# Patient Record
Sex: FEMALE | Race: WHITE | NOT HISPANIC OR LATINO | Employment: OTHER | URBAN - NONMETROPOLITAN AREA
[De-identification: names, ages, dates, MRNs, and addresses within clinical notes are randomized per-mention and may not be internally consistent; named-entity substitution may affect disease eponyms.]

---

## 2020-10-06 ENCOUNTER — HISTORICAL (OUTPATIENT)
Dept: ADMINISTRATIVE | Facility: HOSPITAL | Age: 65
End: 2020-10-06

## 2020-10-13 ENCOUNTER — HISTORICAL (OUTPATIENT)
Dept: ADMINISTRATIVE | Facility: HOSPITAL | Age: 65
End: 2020-10-13

## 2023-04-23 ENCOUNTER — HOSPITAL ENCOUNTER (EMERGENCY)
Facility: HOSPITAL | Age: 68
Discharge: HOME | End: 2023-04-23
Attending: FAMILY MEDICINE
Payer: MEDICARE

## 2023-04-23 VITALS
RESPIRATION RATE: 20 BRPM | BODY MASS INDEX: 25.27 KG/M2 | HEART RATE: 98 BPM | HEIGHT: 67 IN | SYSTOLIC BLOOD PRESSURE: 119 MMHG | WEIGHT: 161 LBS | OXYGEN SATURATION: 96 % | DIASTOLIC BLOOD PRESSURE: 67 MMHG | TEMPERATURE: 99 F

## 2023-04-23 DIAGNOSIS — R07.9 CHEST PAIN: ICD-10-CM

## 2023-04-23 DIAGNOSIS — R10.13 EPIGASTRIC PAIN: ICD-10-CM

## 2023-04-23 DIAGNOSIS — N30.00 ACUTE CYSTITIS WITHOUT HEMATURIA: ICD-10-CM

## 2023-04-23 DIAGNOSIS — R11.2 NAUSEA AND VOMITING, UNSPECIFIED VOMITING TYPE: Primary | ICD-10-CM

## 2023-04-23 LAB
ABS NEUT CALC (OHS): 9.09 X10(3)/MCL (ref 2.1–9.2)
ALBUMIN SERPL-MCNC: 4.9 G/DL (ref 3.4–5)
ALBUMIN/GLOB SERPL: 1.5 RATIO
ALP SERPL-CCNC: 81 UNIT/L (ref 50–144)
ALT SERPL-CCNC: 42 UNIT/L (ref 1–45)
ANION GAP SERPL CALC-SCNC: 9 MEQ/L (ref 2–13)
APPEARANCE UR: CLEAR
AST SERPL-CCNC: 39 UNIT/L (ref 14–36)
BACTERIA #/AREA URNS AUTO: ABNORMAL /HPF
BILIRUB UR QL STRIP.AUTO: NEGATIVE MG/DL
BILIRUBIN DIRECT+TOT PNL SERPL-MCNC: 0.6 MG/DL (ref 0–1)
BUN SERPL-MCNC: 25 MG/DL (ref 7–20)
CALCIUM SERPL-MCNC: 9.7 MG/DL (ref 8.4–10.2)
CHLORIDE SERPL-SCNC: 102 MMOL/L (ref 98–110)
CK SERPL-CCNC: 26 U/L (ref 30–135)
CO2 SERPL-SCNC: 27 MMOL/L (ref 21–32)
COLOR UR AUTO: YELLOW
CREAT SERPL-MCNC: 0.79 MG/DL (ref 0.66–1.25)
CREAT/UREA NIT SERPL: 32 (ref 12–20)
EOSINOPHIL NFR BLD MANUAL: 0.2 X10(3)/MCL (ref 0–0.9)
EOSINOPHIL NFR BLD MANUAL: 2 % (ref 0–8)
ERYTHROCYTE [DISTWIDTH] IN BLOOD BY AUTOMATED COUNT: 12.6 % (ref 11–14.5)
GFR SERPLBLD CREATININE-BSD FMLA CKD-EPI: 81 MLS/MIN/1.73/M2
GLOBULIN SER-MCNC: 3.2 GM/DL (ref 2–3.9)
GLUCOSE SERPL-MCNC: 119 MG/DL (ref 70–115)
GLUCOSE UR QL STRIP.AUTO: NEGATIVE MG/DL
HCT VFR BLD AUTO: 44.3 % (ref 36–48)
HGB BLD-MCNC: 14.7 G/DL (ref 11.8–16)
IMM GRANULOCYTES # BLD AUTO: 0.05 X10(3)/MCL (ref 0–0.03)
IMM GRANULOCYTES NFR BLD AUTO: 0.5 % (ref 0–0.5)
KETONES UR QL STRIP.AUTO: NEGATIVE MG/DL
LEUKOCYTE ESTERASE UR QL STRIP.AUTO: ABNORMAL UNIT/L
LIPASE SERPL-CCNC: 25 U/L (ref 23–300)
LYMPHOCYTES NFR BLD MANUAL: 0.71 X10(3)/MCL
LYMPHOCYTES NFR BLD MANUAL: 7 % (ref 13–40)
MCH RBC QN AUTO: 29.5 PG (ref 27–34)
MCV RBC AUTO: 88.8 FL (ref 79–99)
MEAN CELL HEMOGLOBIN CONCENTRATION (OHS) G/DL: 33.2 G/DL (ref 31–37)
MONOCYTES NFR BLD MANUAL: 0.1 X10(3)/MCL (ref 0.1–1.3)
MONOCYTES NFR BLD MANUAL: 1 % (ref 2–11)
NEUTROPHILS NFR BLD MANUAL: 90 % (ref 47–80)
NITRITE UR QL STRIP.AUTO: NEGATIVE
NRBC BLD AUTO-RTO: 0 % (ref 0–1)
PH UR STRIP.AUTO: 6 [PH]
PLATELET # BLD AUTO: 190 X10(3)/MCL (ref 140–371)
PMV BLD AUTO: 10.5 FL (ref 9.4–12.4)
POTASSIUM SERPL-SCNC: 3.7 MMOL/L (ref 3.5–5.1)
PROT SERPL-MCNC: 8.1 GM/DL (ref 6.3–8.2)
PROT UR QL STRIP.AUTO: ABNORMAL MG/DL
RBC # BLD AUTO: 4.99 X10(6)/MCL (ref 4–5.1)
RBC #/AREA URNS AUTO: ABNORMAL /HPF
RBC UR QL AUTO: ABNORMAL UNIT/L
SODIUM SERPL-SCNC: 138 MMOL/L (ref 135–145)
SP GR UR STRIP.AUTO: 1.01
SQUAMOUS #/AREA URNS AUTO: ABNORMAL /HPF
TROPONIN I SERPL-MCNC: <0.012 NG/ML (ref 0–0.03)
UROBILINOGEN UR STRIP-ACNC: 0.2 MG/DL
WBC # SPEC AUTO: 10.1 X10(3)/MCL (ref 4–11.5)
WBC #/AREA URNS AUTO: >100 /HPF

## 2023-04-23 PROCEDURE — 87077 CULTURE AEROBIC IDENTIFY: CPT | Performed by: FAMILY MEDICINE

## 2023-04-23 PROCEDURE — 93010 ELECTROCARDIOGRAM REPORT: CPT | Mod: ,,, | Performed by: INTERNAL MEDICINE

## 2023-04-23 PROCEDURE — 85027 COMPLETE CBC AUTOMATED: CPT | Performed by: FAMILY MEDICINE

## 2023-04-23 PROCEDURE — 96361 HYDRATE IV INFUSION ADD-ON: CPT

## 2023-04-23 PROCEDURE — 84484 ASSAY OF TROPONIN QUANT: CPT | Performed by: FAMILY MEDICINE

## 2023-04-23 PROCEDURE — 96374 THER/PROPH/DIAG INJ IV PUSH: CPT

## 2023-04-23 PROCEDURE — 83690 ASSAY OF LIPASE: CPT | Performed by: FAMILY MEDICINE

## 2023-04-23 PROCEDURE — 81001 URINALYSIS AUTO W/SCOPE: CPT | Performed by: FAMILY MEDICINE

## 2023-04-23 PROCEDURE — 63600175 PHARM REV CODE 636 W HCPCS: Performed by: FAMILY MEDICINE

## 2023-04-23 PROCEDURE — 99285 EMERGENCY DEPT VISIT HI MDM: CPT | Mod: 25

## 2023-04-23 PROCEDURE — 82550 ASSAY OF CK (CPK): CPT | Performed by: FAMILY MEDICINE

## 2023-04-23 PROCEDURE — 87088 URINE BACTERIA CULTURE: CPT | Performed by: FAMILY MEDICINE

## 2023-04-23 PROCEDURE — 80053 COMPREHEN METABOLIC PANEL: CPT | Performed by: FAMILY MEDICINE

## 2023-04-23 PROCEDURE — 36415 COLL VENOUS BLD VENIPUNCTURE: CPT | Performed by: FAMILY MEDICINE

## 2023-04-23 PROCEDURE — 25000003 PHARM REV CODE 250: Performed by: FAMILY MEDICINE

## 2023-04-23 PROCEDURE — 93010 EKG 12-LEAD: ICD-10-PCS | Mod: ,,, | Performed by: INTERNAL MEDICINE

## 2023-04-23 PROCEDURE — 93005 ELECTROCARDIOGRAM TRACING: CPT

## 2023-04-23 PROCEDURE — 85025 COMPLETE CBC W/AUTO DIFF WBC: CPT | Performed by: FAMILY MEDICINE

## 2023-04-23 RX ORDER — CIPROFLOXACIN 500 MG/1
500 TABLET ORAL EVERY 12 HOURS
Qty: 14 TABLET | Refills: 0 | Status: SHIPPED | OUTPATIENT
Start: 2023-04-23

## 2023-04-23 RX ORDER — CIPROFLOXACIN 500 MG/1
500 TABLET ORAL
Status: COMPLETED | OUTPATIENT
Start: 2023-04-23 | End: 2023-04-23

## 2023-04-23 RX ORDER — PROCHLORPERAZINE EDISYLATE 5 MG/ML
10 INJECTION INTRAMUSCULAR; INTRAVENOUS
Status: COMPLETED | OUTPATIENT
Start: 2023-04-23 | End: 2023-04-23

## 2023-04-23 RX ORDER — PROMETHAZINE HYDROCHLORIDE 25 MG/1
25 TABLET ORAL EVERY 6 HOURS PRN
Qty: 20 TABLET | Refills: 0 | Status: SHIPPED | OUTPATIENT
Start: 2023-04-23

## 2023-04-23 RX ORDER — ALUMINUM HYDROXIDE, MAGNESIUM HYDROXIDE, AND SIMETHICONE 2400; 240; 2400 MG/30ML; MG/30ML; MG/30ML
15 SUSPENSION ORAL EVERY 6 HOURS PRN
Qty: 240 ML | Refills: 0 | Status: SHIPPED | OUTPATIENT
Start: 2023-04-23 | End: 2024-04-22

## 2023-04-23 RX ADMIN — PROCHLORPERAZINE EDISYLATE 10 MG: 5 INJECTION INTRAMUSCULAR; INTRAVENOUS at 02:04

## 2023-04-23 RX ADMIN — SODIUM CHLORIDE 1000 ML: 9 INJECTION, SOLUTION INTRAVENOUS at 02:04

## 2023-04-23 RX ADMIN — CIPROFLOXACIN 500 MG: 500 TABLET, FILM COATED ORAL at 05:04

## 2023-04-23 NOTE — ED PROVIDER NOTES
Encounter Date: 4/23/2023       History     Chief Complaint   Patient presents with    Vomiting     Pt c/o vomiting and epigastric pain since last night w/headache this morning.  Also c/o burning w/urination x2 days and things she has a yeast infection     Patient is a 68-year-old lady who presents with a chief complaint of nausea and vomiting onset last night, epigastric discomfort.  And shooting type headache.  She denies fevers she denies diarrhea she surgical history significant for a cholecystectomy, hysterectomy, abdominoplasty.  She is history of GERD and takes Protonix.      Review of patient's allergies indicates:   Allergen Reactions    Boniva [ibandronate]     Demerol [meperidine]     Ketamine     Limbitrol [amitriptyline-chlordiazepoxide]     Macrobid [nitrofurantoin monohyd/m-cryst]     Nitrofurantoin     Norco [hydrocodone-acetaminophen]     Sulfa (sulfonamide antibiotics)     Tessalon perle [benzonatate]     Trazodone      Past Medical History:   Diagnosis Date    Anxiety disorder, unspecified     Diverticulitis     GERD (gastroesophageal reflux disease)     High cholesterol     Hypothyroidism, unspecified     Interstitial cystitis     Osteoporosis      Past Surgical History:   Procedure Laterality Date    ABDOMINOPLASTY      CHOLECYSTECTOMY      FOOT FRACTURE SURGERY Left     HEEL SPUR SURGERY Right     HYSTERECTOMY       No family history on file.     Review of Systems   Constitutional: Negative.    Respiratory: Negative.     Cardiovascular: Negative.    Gastrointestinal:  Positive for abdominal pain, nausea and vomiting.   Skin: Negative.    Neurological:  Positive for headaches.     Physical Exam     Initial Vitals [04/23/23 1420]   BP Pulse Resp Temp SpO2   124/82 (!) 114 (!) 24 99 °F (37.2 °C) 100 %      MAP       --         Physical Exam    Constitutional: She appears well-developed and well-nourished.   HENT:   Head: Normocephalic and atraumatic.   Eyes: EOM are normal. Pupils are equal,  round, and reactive to light.   Neck: Neck supple.   Normal range of motion.  Cardiovascular:            Tachycardic, no m/g/r   Pulmonary/Chest: Breath sounds normal.   Abdominal: Abdomen is soft.   Epigastric tendernes to palpation   Musculoskeletal:         General: Normal range of motion.      Cervical back: Normal range of motion and neck supple.     Neurological: She is alert and oriented to person, place, and time. She has normal strength. No cranial nerve deficit.       ED Course   Procedures  Labs Reviewed   COMPREHENSIVE METABOLIC PANEL - Abnormal; Notable for the following components:       Result Value    Glucose Level 119 (*)     Blood Urea Nitrogen 25.0 (*)     Aspartate Aminotransferase 39 (*)     BUN/Creatinine Ratio 32 (*)     All other components within normal limits   CK - Abnormal; Notable for the following components:    Creatine Kinase 26 (*)     All other components within normal limits   CBC WITH DIFFERENTIAL - Abnormal; Notable for the following components:    IG# 0.05 (*)     All other components within normal limits   MANUAL DIFFERENTIAL - Abnormal; Notable for the following components:    Neut Man 90 (*)     Lymph Man 7 (*)     Monocyte Man 1 (*)     All other components within normal limits   URINALYSIS - Abnormal; Notable for the following components:    Protein, UA Trace (*)     Blood, UA Small (*)     Leukocyte Esterase, UA Large (*)     All other components within normal limits    Narrative:      URINE STABILITY IS 2 HOURS AT ROOM TEMP OR    SIX HOURS REFRIGERATED. PERFORMING TESTING ON    SPECIMENS GREATER THAN THIS AGE MAY AFFECT THE    FOLLOWING TESTS:    PH          SPECIFIC GRAVITY           BLOOD    CLARITY     BILIRUBIN               UROBILINOGEN   URINALYSIS, MICROSCOPIC - Abnormal; Notable for the following components:    Bacteria, UA 3+ (*)     WBC, UA >100 (*)     Squamous Epithelial Cells, UA Many (*)     All other components within normal limits   LIPASE - Normal    TROPONIN I - Normal   CULTURE, URINE   CBC W/ AUTO DIFFERENTIAL    Narrative:     The following orders were created for panel order CBC auto differential.  Procedure                               Abnormality         Status                     ---------                               -----------         ------                     CBC with Differential[[id number]]        Abnormal            Final result               Manual Differential[[id number]]          Abnormal            Final result                 Please view results for these tests on the individual orders.        ECG Results              EKG 12-lead (Preliminary result)  Result time 04/23/23 15:40:20      Wet Read by Tyshawn Muniz MD (04/23/23 15:40:20, [facility], Emergency Medicine)    Sinus tach, rate 106, nl qrs, nl st, nl intervals                                  Imaging Results              CT Abdomen Pelvis  Without Contrast (Final result)  Result time 04/23/23 16:48:15      Final result by Shiv Kendrick MD (04/23/23 16:48:15)                   Impression:        1. There is a large amount over a radiopaque fecal content throughout the colon which can be seen with constipation.    n/a    CATEGORY:n/a    The following dose reduction techniques are used for all CT at [facility]:    1.   Automated exposure control.    2.   Adjustment of the mA and/or kV according to patient size.    3.   Use of iterative reconstruction technique.      Electronically signed by: Shiv Kendrick  Date:    04/23/2023  Time:    16:48               Narrative:      STUDY: CT SCAN OF THE ABDOMEN AND PELVIS WITHOUT INTRAVENOUS CONTRAST    CLINICAL HISTORY & TECHNIQUE:    Lynne Herrera, RT on 4/23/2023  3:38 PM    PT STATUS: ER PT    PROCEDURE: CT ABD/PELVIS W/O CONT    CLINICAL HX:    X 1 DAY    C/O VOMITTING AND EPIGASTRIC PAIN SINCE LAST NIGHT WITH HA THIS AM    ALSO C/O BURNING WITH URINATION    PMH:  N/A    TECHNIQUE:    IV CONTRAST:  NONE    ORAL CONTRAST: NONE    RECTAL CONTRAST: NONE    AXIAL IMAGES @ 5MM INTERVALS WITH MULTI PLANAR RECONSTRUCTION OF IMAGES    TOTAL # OF CT SCANS IN PAST 12 MONTHS: 2    TOTAL IMAGE NUMBER: 150    CTDIvol(mGy): HEAD:  BODY: 6.20    DLP(mGycm): HEAD:  BODY: 353.40    TECH: NN    PT TRANSPORTED W\O INCIDENT    COMPARISON:   none    FINDINGS:    Liver:  No clinically significant abnormalities noted.    Gallbladder/Biliary System:  Compatible with a previous cholecystectomy.    Spleen:  No clinically significant abnormalities noted.    Adrenal glands:  No clinically significant abnormalities noted.    Pancreas:  No clinically significant abnormalities noted.    Kidneys/Urinary Tract:  No clinically significant abnormalities noted.    Urinary bladder:  No clinically significant abnormalities noted.    Prostate gland/uterus and ovaries:  Compatible with a previous hysterectomy    GI tract:  Fluid is present within multiple loops of small bowel.  A large amount of fecal content is present in the colon which can be seen with constipation.    Vascular structures:  Mild atherosclerotic calcification is present in the aorta.    Musculoskeletal structures:  No clinically significant abnormalities noted.    Miscellaneous:  No clinically significant abnormalities noted.                                       CT Head Without Contrast (Preliminary result)  Result time 04/23/23 15:39:12      Wet Read by Tyshawn Muniz MD (04/23/23 15:39:12, [facility] Emergency Medicine)    Per rad: minimal atrophy, no acute findings                                     Medications   ciprofloxacin HCl tablet 500 mg (has no administration in time range)   prochlorperazine injection Soln 10 mg (10 mg Intravenous Given 4/23/23 1435)   sodium chloride 0.9% bolus 1,000 mL 1,000 mL (0 mLs Intravenous Stopped 4/23/23 1626)     Medical Decision Making:   Differential Diagnosis:    Non-stemi, perforated ulcer, intestinal obstruction, gastritis,   Clinical Tests:   Lab Tests: Ordered and Reviewed  The following lab test(s) were unremarkable: CBC, BMP and Urinalysis  Radiological Study: Ordered and Reviewed  Medical Tests: Ordered and Reviewed                        Clinical Impression:   Final diagnoses:  [R07.9] Chest pain  [R11.2] Nausea and vomiting, unspecified vomiting type (Primary)  [R10.13] Epigastric pain  [N30.00] Acute cystitis without hematuria        ED Disposition Condition    Discharge Stable          ED Prescriptions       Medication Sig Dispense Start Date End Date Auth. Provider    promethazine (PHENERGAN) 25 MG tablet Take 1 tablet (25 mg total) by mouth every 6 (six) hours as needed for Nausea. 20 tablet 4/23/2023 -- Tyshawn Muniz MD    aluminum & magnesium hydroxide-simethicone (MAALOX MAXIMUM STRENGTH) 400-400-40 mg/5 mL suspension Take 15 mLs by mouth every 6 (six) hours as needed for Indigestion. 240 mL 4/23/2023 4/22/2024 Tyshawn Muniz MD    ciprofloxacin HCl (CIPRO) 500 MG tablet Take 1 tablet (500 mg total) by mouth every 12 (twelve) hours. 14 tablet 4/23/2023 -- Tyshawn Muniz MD          Follow-up Information       Follow up With Specialties Details Why Contact Info    Shobha Wiley MD Internal Medicine In 1 day  [address] [place] [id number] [phone]               Tyshawn Muniz MD  04/23/23 8737

## 2023-04-27 LAB — BACTERIA UR CULT: ABNORMAL

## 2024-10-28 PROBLEM — M18.11 PRIMARY OSTEOARTHRITIS OF FIRST CARPOMETACARPAL JOINT OF RIGHT HAND: Status: ACTIVE | Noted: 2024-10-28

## 2024-10-28 PROBLEM — M18.11 PRIMARY OSTEOARTHRITIS OF FIRST CARPOMETACARPAL JOINT OF RIGHT HAND: Chronic | Status: ACTIVE | Noted: 2024-10-28

## 2024-10-28 NOTE — H&P (VIEW-ONLY)
Subjective:      Patient ID: Camila Duncan  Chief Complaint: Camila Duncan is a 69 y.o. female who presents today for f/u for osteoarthritis left CMC joint.  She is ready to discuss surgery.      Pt was last seen 11/09/2021. At that time we discussed arthroplasty but patient was not ready to schedule.      Past Medical History:   Diagnosis Date    Anxiety disorder, unspecified     Diverticulitis     GERD (gastroesophageal reflux disease)     High cholesterol     Hypothyroidism, unspecified     Interstitial cystitis     Osteoporosis      Past Surgical History:   Procedure Laterality Date    ABDOMINOPLASTY      CHOLECYSTECTOMY      FOOT FRACTURE SURGERY Left     FUSION, CMC JOINT, THUMB, WITH INTERNAL FIXATION IF INDICATED Left 10/13/2020    HEEL SPUR SURGERY Right     HYSTERECTOMY       Social History     Occupational History    Not on file   Tobacco Use    Smoking status: Never    Smokeless tobacco: Never   Substance and Sexual Activity    Alcohol use: Not Currently    Drug use: Not on file    Sexual activity: Not on file      ROS  Current Outpatient Medications on File Prior to Visit   Medication Sig Dispense Refill    ezetimibe (ZETIA) 10 mg tablet Take 1 tablet by mouth once daily.      GEMTESA 75 mg Tab Take 1 tablet by mouth.      levocetirizine (XYZAL) 5 MG tablet Take 1 tablet by mouth once daily.      meclizine (ANTIVERT) 25 mg tablet Take 25 mg by mouth 3 (three) times daily as needed.      montelukast (SINGULAIR) 10 mg tablet Take 1 tablet by mouth once daily.      neomycin-polymyxin-dexamethasone (DEXACINE) 3.5 mg/g-10,000 unit/g-0.1 % Oint Place into both eyes 2 (two) times daily.      pantoprazole (PROTONIX) 40 MG tablet 1 tablet by mouth once a day  TAKE 1 TABLET BY MOUTH ONCE DAILY FOR HEARTBURN, REFLUX, GERD SYMPTOMS      phenazopyridine (PYRIDIUM) 200 MG tablet Take 200 mg by mouth 3 (three) times daily as needed.      rosuvastatin (CRESTOR) 10 MG tablet Take 1 tablet by mouth every evening.       "SYNTHROID 112 mcg tablet Take 112 mcg by mouth.      tobramycin-dexAMETHasone 0.3-0.1% (TOBRADEX) 0.3-0.1 % DrpS       venlafaxine (EFFEXOR-XR) 75 MG 24 hr capsule Take 1 capsule by mouth once daily.      dextran 70-hypromellose Dpet 1 drop into both eyes once a day      selenium 200 mcg Cap 1 capsule by mouth once a day      [DISCONTINUED] aluminum & magnesium hydroxide-simethicone (MAALOX MAXIMUM STRENGTH) 400-400-40 mg/5 mL suspension Take 15 mLs by mouth every 6 (six) hours as needed for Indigestion. 240 mL 0    [DISCONTINUED] ciprofloxacin HCl (CIPRO) 500 MG tablet Take 1 tablet (500 mg total) by mouth every 12 (twelve) hours. 14 tablet 0    [DISCONTINUED] promethazine (PHENERGAN) 25 MG tablet Take 1 tablet (25 mg total) by mouth every 6 (six) hours as needed for Nausea. 20 tablet 0     No current facility-administered medications on file prior to visit.     Review of patient's allergies indicates:   Allergen Reactions    Boniva [ibandronate]     Demerol [meperidine]     Ketamine     Limbitrol [amitriptyline-chlordiazepoxide]     Macrobid [nitrofurantoin monohyd/m-cryst]     Nitrofurantoin     Norco [hydrocodone-acetaminophen]     Sulfa (sulfonamide antibiotics)     Tessalon perle [benzonatate]     Trazodone          Objective:      Vitals:    10/28/24 0937   Weight: 75.9 kg (167 lb 6.4 oz)   Height: 5' 7" (1.702 m)     Ortho Exam She was tender over the 1st CMC joint with palpation.  Her pinch is slightly diminished in strength.  She has pain with grind test.  Her range of motion is good     Neurological Exam   Intact     Imaging:  Xrays of the right hand shows advanced osteoarthritis of the carpometacarpal joint.        Assessment:       1. Primary osteoarthritis of first carpometacarpal joint of right hand          Plan:       Camila Duncan is a 69 y.o. female who advanced 1st CMC arthritis on the right.  She had previous successful interposition arthroplasty using flexor carpi radialis through a volar " incision.  I discussed with her make an incision through the snuffbox and doing another interposition arthroplasty on the right using FCR graft and possible stabilization with an Arthrex suspension device.

## 2024-10-31 ENCOUNTER — HOSPITAL ENCOUNTER (OUTPATIENT)
Dept: RADIOLOGY | Facility: HOSPITAL | Age: 69
Discharge: HOME | End: 2024-10-31
Attending: SPECIALIST
Payer: MEDICARE

## 2024-10-31 ENCOUNTER — CLINICAL SUPPORT (OUTPATIENT)
Dept: RESPIRATORY THERAPY | Facility: HOSPITAL | Age: 69
End: 2024-10-31
Attending: SPECIALIST
Payer: MEDICARE

## 2024-10-31 DIAGNOSIS — M18.11 PRIMARY OSTEOARTHRITIS OF FIRST CARPOMETACARPAL JOINT OF RIGHT HAND: ICD-10-CM

## 2024-10-31 LAB
OHS QRS DURATION: 82 MS
OHS QTC CALCULATION: 438 MS

## 2024-10-31 PROCEDURE — 71046 X-RAY EXAM CHEST 2 VIEWS: CPT | Mod: TC

## 2024-10-31 PROCEDURE — 93010 ELECTROCARDIOGRAM REPORT: CPT | Mod: ,,, | Performed by: INTERNAL MEDICINE

## 2024-10-31 PROCEDURE — 93005 ELECTROCARDIOGRAM TRACING: CPT

## 2024-10-31 RX ORDER — MAGNESIUM 250 MG
1 TABLET ORAL EVERY MORNING
COMMUNITY

## 2024-10-31 RX ORDER — DOCUSATE SODIUM 100 MG/1
100 CAPSULE, LIQUID FILLED ORAL 2 TIMES DAILY PRN
COMMUNITY

## 2024-10-31 RX ORDER — AMOXICILLIN 500 MG
1 CAPSULE ORAL EVERY MORNING
COMMUNITY

## 2024-10-31 RX ORDER — DENOSUMAB 60 MG/ML
60 INJECTION SUBCUTANEOUS
COMMUNITY

## 2024-10-31 RX ORDER — ASPIRIN 81 MG/1
81 TABLET ORAL EVERY MORNING
COMMUNITY

## 2024-10-31 RX ORDER — OXYBUTYNIN CHLORIDE 5 MG/1
10 TABLET ORAL 2 TIMES DAILY PRN
COMMUNITY

## 2024-10-31 RX ORDER — VIT C/E/ZN/COPPR/LUTEIN/ZEAXAN 250MG-90MG
5000 CAPSULE ORAL EVERY MORNING
COMMUNITY

## 2024-10-31 RX ORDER — MULTIVITAMIN
1 TABLET ORAL EVERY MORNING
COMMUNITY

## 2024-11-04 ENCOUNTER — ANESTHESIA EVENT (OUTPATIENT)
Dept: SURGERY | Facility: HOSPITAL | Age: 69
End: 2024-11-04
Payer: MEDICARE

## 2024-11-04 NOTE — ANESTHESIA PREPROCEDURE EVALUATION
11/04/2024  Camila Duncan is a 69 y.o., female.      Pre-op Assessment    I have reviewed the Patient Summary Reports.     I have reviewed the Nursing Notes. I have reviewed the NPO Status.   I have reviewed the Medications.     Review of Systems  Anesthesia Hx:             Denies Family Hx of Anesthesia complications.    Denies Personal Hx of Anesthesia complications.                    Social:  Non-Smoker, No Alcohol Use       Hematology/Oncology:  Hematology Normal   Oncology Normal                                   EENT/Dental:  EENT/Dental Normal           Cardiovascular:  Cardiovascular Normal                                              Pulmonary:  Pulmonary Normal                       Renal/:  Renal/ Normal                 Hepatic/GI:     GERD, well controlled                Musculoskeletal:  Arthritis               Neurological:  Neurology Normal                                      Endocrine:   Hypothyroidism          Dermatological:  Skin Normal    Psych:  Psychiatric History                  Physical Exam  General: Cooperative, Oriented and Alert    Airway:  Mallampati: II   Mouth Opening: Normal  TM Distance: Normal  Tongue: Normal  Neck ROM: Normal ROM    Dental:  Intact        Anesthesia Plan  Type of Anesthesia, risks & benefits discussed:    Anesthesia Type: Gen Supraglottic Airway  Intra-op Monitoring Plan: Standard ASA Monitors  Post Op Pain Control Plan: multimodal analgesia  Induction:  IV  Informed Consent: Informed consent signed with the Patient and all parties understand the risks and agree with anesthesia plan.  All questions answered. Patient consented to blood products? Yes  ASA Score: 2    Ready For Surgery From Anesthesia Perspective.     .

## 2024-11-05 ENCOUNTER — HOSPITAL ENCOUNTER (OUTPATIENT)
Facility: HOSPITAL | Age: 69
Discharge: HOME | End: 2024-11-05
Attending: SPECIALIST | Admitting: SPECIALIST
Payer: MEDICARE

## 2024-11-05 ENCOUNTER — ANESTHESIA (OUTPATIENT)
Dept: SURGERY | Facility: HOSPITAL | Age: 69
End: 2024-11-05
Payer: MEDICARE

## 2024-11-05 VITALS
RESPIRATION RATE: 18 BRPM | DIASTOLIC BLOOD PRESSURE: 71 MMHG | HEIGHT: 67 IN | WEIGHT: 167.31 LBS | BODY MASS INDEX: 26.26 KG/M2 | SYSTOLIC BLOOD PRESSURE: 109 MMHG | HEART RATE: 93 BPM | OXYGEN SATURATION: 94 % | TEMPERATURE: 98 F

## 2024-11-05 DIAGNOSIS — M18.11 PRIMARY OSTEOARTHRITIS OF FIRST CARPOMETACARPAL JOINT OF RIGHT HAND: ICD-10-CM

## 2024-11-05 PROCEDURE — 71000033 HC RECOVERY, INTIAL HOUR: Performed by: SPECIALIST

## 2024-11-05 PROCEDURE — 36000708 HC OR TIME LEV III 1ST 15 MIN: Performed by: SPECIALIST

## 2024-11-05 PROCEDURE — 63600175 PHARM REV CODE 636 W HCPCS: Mod: JZ,JG | Performed by: SPECIALIST

## 2024-11-05 PROCEDURE — D9220A PRA ANESTHESIA: Mod: ,,, | Performed by: NURSE ANESTHETIST, CERTIFIED REGISTERED

## 2024-11-05 PROCEDURE — 71000015 HC POSTOP RECOV 1ST HR: Performed by: SPECIALIST

## 2024-11-05 PROCEDURE — 25000003 PHARM REV CODE 250: Performed by: ANESTHESIOLOGY

## 2024-11-05 PROCEDURE — 37000008 HC ANESTHESIA 1ST 15 MINUTES: Performed by: SPECIALIST

## 2024-11-05 PROCEDURE — 36000709 HC OR TIME LEV III EA ADD 15 MIN: Performed by: SPECIALIST

## 2024-11-05 PROCEDURE — 63600175 PHARM REV CODE 636 W HCPCS: Performed by: PHYSICIAN ASSISTANT

## 2024-11-05 PROCEDURE — 25000003 PHARM REV CODE 250: Performed by: NURSE ANESTHETIST, CERTIFIED REGISTERED

## 2024-11-05 PROCEDURE — 63600175 PHARM REV CODE 636 W HCPCS: Performed by: ANESTHESIOLOGY

## 2024-11-05 PROCEDURE — 63600175 PHARM REV CODE 636 W HCPCS: Performed by: NURSE ANESTHETIST, CERTIFIED REGISTERED

## 2024-11-05 PROCEDURE — 37000009 HC ANESTHESIA EA ADD 15 MINS: Performed by: SPECIALIST

## 2024-11-05 PROCEDURE — 27201423 OPTIME MED/SURG SUP & DEVICES STERILE SUPPLY: Performed by: SPECIALIST

## 2024-11-05 PROCEDURE — 71000016 HC POSTOP RECOV ADDL HR: Performed by: SPECIALIST

## 2024-11-05 RX ORDER — LIDOCAINE HYDROCHLORIDE 20 MG/ML
INJECTION, SOLUTION EPIDURAL; INFILTRATION; INTRACAUDAL; PERINEURAL
Status: DISCONTINUED | OUTPATIENT
Start: 2024-11-05 | End: 2024-11-05

## 2024-11-05 RX ORDER — BUPIVACAINE HYDROCHLORIDE 2.5 MG/ML
INJECTION, SOLUTION EPIDURAL; INFILTRATION; INTRACAUDAL
Status: DISCONTINUED | OUTPATIENT
Start: 2024-11-05 | End: 2024-11-05 | Stop reason: HOSPADM

## 2024-11-05 RX ORDER — PROPOFOL 10 MG/ML
INJECTION, EMULSION INTRAVENOUS
Status: DISCONTINUED | OUTPATIENT
Start: 2024-11-05 | End: 2024-11-05

## 2024-11-05 RX ORDER — DEXMEDETOMIDINE HYDROCHLORIDE 100 UG/ML
INJECTION, SOLUTION INTRAVENOUS
Status: DISCONTINUED | OUTPATIENT
Start: 2024-11-05 | End: 2024-11-05

## 2024-11-05 RX ORDER — KETOROLAC TROMETHAMINE 30 MG/ML
INJECTION, SOLUTION INTRAMUSCULAR; INTRAVENOUS
Status: DISCONTINUED | OUTPATIENT
Start: 2024-11-05 | End: 2024-11-05

## 2024-11-05 RX ORDER — DEXAMETHASONE SODIUM PHOSPHATE 4 MG/ML
INJECTION, SOLUTION INTRA-ARTICULAR; INTRALESIONAL; INTRAMUSCULAR; INTRAVENOUS; SOFT TISSUE
Status: DISCONTINUED | OUTPATIENT
Start: 2024-11-05 | End: 2024-11-05

## 2024-11-05 RX ORDER — HYDROMORPHONE HYDROCHLORIDE 2 MG/ML
0.5 INJECTION, SOLUTION INTRAMUSCULAR; INTRAVENOUS; SUBCUTANEOUS EVERY 4 HOURS PRN
Status: DISCONTINUED | OUTPATIENT
Start: 2024-11-05 | End: 2024-11-05 | Stop reason: HOSPADM

## 2024-11-05 RX ORDER — GABAPENTIN 300 MG/1
300 CAPSULE ORAL
Status: COMPLETED | OUTPATIENT
Start: 2024-11-05 | End: 2024-11-05

## 2024-11-05 RX ORDER — TRAMADOL HYDROCHLORIDE 50 MG/1
50 TABLET ORAL EVERY 6 HOURS PRN
Status: DISCONTINUED | OUTPATIENT
Start: 2024-11-05 | End: 2024-11-05 | Stop reason: HOSPADM

## 2024-11-05 RX ORDER — CEFADROXIL 500 MG/1
500 CAPSULE ORAL EVERY 12 HOURS
Qty: 14 CAPSULE | Refills: 0 | Status: SHIPPED | OUTPATIENT
Start: 2024-11-05 | End: 2024-11-12

## 2024-11-05 RX ORDER — HYDROCODONE BITARTRATE AND ACETAMINOPHEN 10; 325 MG/1; MG/1
1 TABLET ORAL EVERY 8 HOURS PRN
Qty: 21 TABLET | Refills: 0 | Status: SHIPPED | OUTPATIENT
Start: 2024-11-05

## 2024-11-05 RX ORDER — SODIUM CHLORIDE, SODIUM LACTATE, POTASSIUM CHLORIDE, CALCIUM CHLORIDE 600; 310; 30; 20 MG/100ML; MG/100ML; MG/100ML; MG/100ML
INJECTION, SOLUTION INTRAVENOUS CONTINUOUS PRN
Status: DISCONTINUED | OUTPATIENT
Start: 2024-11-05 | End: 2024-11-05

## 2024-11-05 RX ORDER — CEFAZOLIN SODIUM 1 G/3ML
2 INJECTION, POWDER, FOR SOLUTION INTRAMUSCULAR; INTRAVENOUS
Status: COMPLETED | OUTPATIENT
Start: 2024-11-05 | End: 2024-11-05

## 2024-11-05 RX ORDER — ONDANSETRON HYDROCHLORIDE 2 MG/ML
INJECTION, SOLUTION INTRAVENOUS
Status: DISCONTINUED | OUTPATIENT
Start: 2024-11-05 | End: 2024-11-05

## 2024-11-05 RX ORDER — PHENYLEPHRINE HYDROCHLORIDE 10 MG/ML
INJECTION INTRAVENOUS
Status: DISCONTINUED | OUTPATIENT
Start: 2024-11-05 | End: 2024-11-05

## 2024-11-05 RX ORDER — ONDANSETRON HYDROCHLORIDE 2 MG/ML
4 INJECTION, SOLUTION INTRAVENOUS EVERY 12 HOURS PRN
Status: DISCONTINUED | OUTPATIENT
Start: 2024-11-05 | End: 2024-11-05 | Stop reason: HOSPADM

## 2024-11-05 RX ORDER — OXYCODONE HYDROCHLORIDE 5 MG/1
10 TABLET ORAL EVERY 6 HOURS PRN
Status: DISCONTINUED | OUTPATIENT
Start: 2024-11-05 | End: 2024-11-05 | Stop reason: HOSPADM

## 2024-11-05 RX ORDER — SODIUM CHLORIDE 0.9 % (FLUSH) 0.9 %
10 SYRINGE (ML) INJECTION
Status: DISCONTINUED | OUTPATIENT
Start: 2024-11-05 | End: 2024-11-05

## 2024-11-05 RX ORDER — DROPERIDOL 2.5 MG/ML
0.62 INJECTION, SOLUTION INTRAMUSCULAR; INTRAVENOUS ONCE AS NEEDED
Status: DISCONTINUED | OUTPATIENT
Start: 2024-11-05 | End: 2024-11-05

## 2024-11-05 RX ORDER — SODIUM CHLORIDE, SODIUM LACTATE, POTASSIUM CHLORIDE, CALCIUM CHLORIDE 600; 310; 30; 20 MG/100ML; MG/100ML; MG/100ML; MG/100ML
INJECTION, SOLUTION INTRAVENOUS CONTINUOUS
Status: DISCONTINUED | OUTPATIENT
Start: 2024-11-05 | End: 2024-11-05

## 2024-11-05 RX ORDER — ONDANSETRON 4 MG/1
8 TABLET, ORALLY DISINTEGRATING ORAL EVERY 8 HOURS PRN
Status: DISCONTINUED | OUTPATIENT
Start: 2024-11-05 | End: 2024-11-05 | Stop reason: HOSPADM

## 2024-11-05 RX ORDER — FENTANYL CITRATE 50 UG/ML
25 INJECTION, SOLUTION INTRAMUSCULAR; INTRAVENOUS EVERY 5 MIN PRN
Status: DISCONTINUED | OUTPATIENT
Start: 2024-11-05 | End: 2024-11-05

## 2024-11-05 RX ORDER — FENTANYL CITRATE 50 UG/ML
INJECTION, SOLUTION INTRAMUSCULAR; INTRAVENOUS
Status: DISCONTINUED | OUTPATIENT
Start: 2024-11-05 | End: 2024-11-05

## 2024-11-05 RX ORDER — GLUCAGON 1 MG
1 KIT INJECTION
Status: DISCONTINUED | OUTPATIENT
Start: 2024-11-05 | End: 2024-11-05

## 2024-11-05 RX ADMIN — PHENYLEPHRINE HYDROCHLORIDE 100 MCG: 10 INJECTION INTRAVENOUS at 07:11

## 2024-11-05 RX ADMIN — PROPOFOL 150 MG: 10 INJECTION, EMULSION INTRAVENOUS at 06:11

## 2024-11-05 RX ADMIN — SODIUM CHLORIDE, POTASSIUM CHLORIDE, SODIUM LACTATE AND CALCIUM CHLORIDE: 600; 310; 30; 20 INJECTION, SOLUTION INTRAVENOUS at 06:11

## 2024-11-05 RX ADMIN — LIDOCAINE HYDROCHLORIDE 60 MG: 20 INJECTION, SOLUTION EPIDURAL; INFILTRATION; INTRACAUDAL; PERINEURAL at 06:11

## 2024-11-05 RX ADMIN — DEXMEDETOMIDINE 10 MCG: 200 INJECTION, SOLUTION INTRAVENOUS at 07:11

## 2024-11-05 RX ADMIN — FENTANYL CITRATE 50 MCG: 50 INJECTION, SOLUTION INTRAMUSCULAR; INTRAVENOUS at 06:11

## 2024-11-05 RX ADMIN — KETOROLAC TROMETHAMINE 15 MG: 30 INJECTION, SOLUTION INTRAMUSCULAR at 07:11

## 2024-11-05 RX ADMIN — FENTANYL CITRATE 50 MCG: 50 INJECTION, SOLUTION INTRAMUSCULAR; INTRAVENOUS at 07:11

## 2024-11-05 RX ADMIN — DEXMEDETOMIDINE 10 MCG: 200 INJECTION, SOLUTION INTRAVENOUS at 06:11

## 2024-11-05 RX ADMIN — GABAPENTIN 300 MG: 300 CAPSULE ORAL at 05:11

## 2024-11-05 RX ADMIN — CEFAZOLIN 2 G: 330 INJECTION, POWDER, FOR SOLUTION INTRAMUSCULAR; INTRAVENOUS at 06:11

## 2024-11-05 RX ADMIN — DEXAMETHASONE SODIUM PHOSPHATE 4 MG: 4 INJECTION, SOLUTION INTRA-ARTICULAR; INTRALESIONAL; INTRAMUSCULAR; INTRAVENOUS; SOFT TISSUE at 06:11

## 2024-11-05 RX ADMIN — ONDANSETRON 4 MG: 2 INJECTION INTRAMUSCULAR; INTRAVENOUS at 06:11

## 2024-11-05 RX ADMIN — SODIUM CHLORIDE, POTASSIUM CHLORIDE, SODIUM LACTATE AND CALCIUM CHLORIDE 500 ML: 600; 310; 30; 20 INJECTION, SOLUTION INTRAVENOUS at 05:11

## 2024-11-05 NOTE — TRANSFER OF CARE
"Anesthesia Transfer of Care Note    Patient: Camila Duncan    Procedure(s) Performed: Procedure(s) (LRB):  INTERPOSITION ARTHROPLASTY, CMC JOINT (Right)    Patient location: PACU    Anesthesia Type: general    Transport from OR: Transported from OR on room air with adequate spontaneous ventilation    Post pain: adequate analgesia    Post assessment: no apparent anesthetic complications    Post vital signs: stable    Level of consciousness: awake    Nausea/Vomiting: no nausea/vomiting    Complications: none    Transfer of care protocol was followed      Last vitals: Visit Vitals  /86 (BP Location: Left arm, Patient Position: Lying)   Pulse 82   Temp 36.9 °C (98.4 °F) (Oral)   Resp 18   Ht 5' 7" (1.702 m)   Wt 75.9 kg (167 lb 5.3 oz)   SpO2 (!) 94%   Breastfeeding No   BMI 26.21 kg/m²     "

## 2024-11-05 NOTE — DISCHARGE SUMMARY
[facility] - Periop Services  Discharge Note  Short Stay    Procedure(s) (LRB):  INTERPOSITION ARTHROPLASTY, CMC JOINT (Right)      OUTCOME: Patient tolerated treatment/procedure well without complication and is now ready for discharge.    DISPOSITION: Home or Self Care    FINAL DIAGNOSIS:  Primary osteoarthritis of first carpometacarpal joint of right hand    FOLLOWUP: In clinic    DISCHARGE INSTRUCTIONS:    Discharge Procedure Orders   Diet general     Keep surgical extremity elevated     Ice to affected area   Order Comments: using barrier between ice and skin (specify duration&frequency)     No driving, operating heavy equipment or signing legal documents while taking pain medication     Call MD for:  temperature >100.4     Call MD for:  persistent nausea and vomiting     Call MD for:  severe uncontrolled pain     Call MD for:  difficulty breathing, headache or visual disturbances     Call MD for:  redness, tenderness, or signs of infection (pain, swelling, redness, odor or green/yellow discharge around incision site)     Call MD for:  hives     Call MD for:  persistent dizziness or light-headedness     Call MD for:  extreme fatigue     Lifting restrictions   Order Comments: No lifting with the affected extremity     Leave dressing on - Keep it clean, dry, and intact until clinic visit   Order Comments: Keep splint in place until follow-up appointment     Weight bearing restrictions (specify)        TIME SPENT ON DISCHARGE: 20 minutes

## 2024-11-05 NOTE — ANESTHESIA POSTPROCEDURE EVALUATION
Anesthesia Post Evaluation    Patient: Camila Duncan    Procedure(s) Performed: Procedure(s) (LRB):  INTERPOSITION ARTHROPLASTY, CMC JOINT (Right)    Final Anesthesia Type: general      Patient location during evaluation: PACU  Patient participation: Yes- Able to Participate  Level of consciousness: awake and alert and oriented  Post-procedure vital signs: reviewed and stable  Pain management: adequate  Airway patency: patent    PONV status at discharge: No PONV  Anesthetic complications: no      Cardiovascular status: stable  Respiratory status: unassisted, spontaneous ventilation and room air  Hydration status: euvolemic  Follow-up not needed.              Vitals Value Taken Time   /78 11/05/24 0831   Temp 37 °C (98.6 °F) 11/05/24 0811   Pulse 87 11/05/24 0831   Resp 7 11/05/24 0822   SpO2 95 % 11/05/24 0831   Vitals shown include unfiled device data.      Event Time   Out of Recovery 08:26:02         Pain/Chidi Score: Chidi Score: 8 (11/5/2024  8:21 AM)

## 2024-11-05 NOTE — ANESTHESIA PROCEDURE NOTES
Intubation    Date/Time: 11/5/2024 6:47 AM    Performed by: Kale Rosado CRNA  Authorized by: Kumar Veliz DO    Intubation:     Induction:  Intravenous    Mask Ventilation:  N/a    Attempts:  1    Attempted By:  CRNA    Difficult Airway Encountered?: No      Airway Device:  Supraglottic airway/LMA    Airway Device Size:  3.0    Style/Cuff Inflation:  Uncuffed    Placement Verified By:  Capnometry    Complicating Factors:  None    Findings Post-Intubation:  BS equal bilateral and atraumatic/condition of teeth unchanged

## 2024-11-07 NOTE — OP NOTE
Operative note     Date: 11/5/2024    Preop diagnosis:  Right 1st CMC osteoarthritis    Postop diagnosis: same    Procedure:  Right 1st carpometacarpal interposition arthroplasty using FCR tendon autograft    Surgeon: Markus Crawford M.D.    Assistant: MISHA Rojas    Anesthesia:  General    Complications: none    Blood loss: nil    Procedure in detail:  Informed consent was obtained.  Risks of the procedure was explained not excluding infection, bleeding, pain, scarring, neurovascular injury, persistent symptoms, possible need for future surgery.  This is an active 69-year-old female who I did same surgery on the left CMC many years ago and she was happy with the.  She was here for the same.  She was brought to the OR given IV antibiotics and general anesthesia.  Right upper extremity was prepped and draped sterilely and the tourniquet inflated.  A harvested the FCR tendon 50% on the radial side.  Three small transverse incisions were made and a harvested 50% of the tendon.  Tied it upon itself multiple times creating a nice anterior Bovie like interposition graft.  Using 2-0 Ethibond to hold it together.  Next this was placed in the back table.  Next incision was made over the snuffbox and the 1st CMC was clearly located.  I opened the capsule and holding traction on the thumb took a bur and created a socket removing the degenerative cartilage from the trapezium and the base of the 1st metacarpal.  Next holding the thumb in traction we stuffed the interposition graft into the joint which distracted it and held it nicely.  Fluoro confirmed this next the thumb base was checked for stability and it did not sublux so I did not feel the need for stabilization to the 2nd metacarpal.  Closed the capsule with 2-0 Ethibond.  SubQ was closed skin was closed and the skin with Exofin glue.  Sterile dressing and a thumb spica splint was applied.  No complications.    Markus Crawford M.D.

## 2024-12-17 PROBLEM — Z98.890 STATUS POST WRIST SURGERY: Status: ACTIVE | Noted: 2024-12-17

## 2025-02-11 NOTE — H&P (VIEW-ONLY)
HPI:   Camila Duncan is a 69 y.o. female who presents today for Post op RT CMC joint arthroplasty DOS 11/5/2024. Patient states she is having increased significant pain since she removed her wrist brace after the last visit. She states the CMC joint stays looking slightly discolored and that it feels like more than a pain associated with stiffness. The pain is throughout the joint and radiates down into the thumb.    Patient Active Problem List   Diagnosis    Primary osteoarthritis of first carpometacarpal joint of right hand    Status post wrist surgery       Past Medical History:   Diagnosis Date    Anxiety disorder, unspecified     Diverticulitis     GERD (gastroesophageal reflux disease)     High cholesterol     Hypothyroidism, unspecified     Interstitial cystitis     Osteoporosis     PONV (postoperative nausea and vomiting)     TMJ (temporomandibular joint disorder)        Past Surgical History:   Procedure Laterality Date    ABDOMINOPLASTY      CARDIAC CATHETERIZATION      CATARACT EXTRACTION Bilateral     CHOLECYSTECTOMY      COLONOSCOPY      ESOPHAGOGASTRODUODENOSCOPY      FOOT FRACTURE SURGERY Left     FUSION, CMC JOINT, THUMB, WITH INTERNAL FIXATION IF INDICATED Left 10/13/2020    HEEL SPUR SURGERY Right     HYSTERECTOMY      INTERPOSITION ARTHROPLASTY OF CARPOMETACARPAL JOINTS Right 11/5/2024    Procedure: INTERPOSITION ARTHROPLASTY, CMC JOINT;  Surgeon: Markus Crawford MD;  Location: [facility];  Service: Orthopedics;  Laterality: Right;    WISDOM TOOTH EXTRACTION           Current Outpatient Medications:     aspirin (ECOTRIN) 81 MG EC tablet, Take 81 mg by mouth every morning., Disp: , Rfl:     carboxymethylcellulose sodium 0.25 % Dpet, Place 1 drop into both eyes as needed., Disp: , Rfl:     cholecalciferol, vitamin D3, 125 mcg (5,000 unit) capsule, Take 5,000 Units by mouth every morning., Disp: , Rfl:     denosumab (PROLIA) 60 mg/mL Syrg, Inject 60 mg into the skin every 6 (six) months.,  Disp: , Rfl:     docusate sodium (COLACE) 100 MG capsule, Take 100 mg by mouth 2 (two) times daily as needed for Constipation., Disp: , Rfl:     ezetimibe (ZETIA) 10 mg tablet, Take 1 tablet by mouth every evening., Disp: , Rfl:     glucosamine/chondr estrella A sod (OSTEO BI-FLEX ORAL), Take 1 tablet by mouth every morning., Disp: , Rfl:     HYDROcodone-acetaminophen (NORCO)  mg per tablet, Take 1 tablet by mouth every 8 (eight) hours as needed., Disp: 21 tablet, Rfl: 0    Lactobacillus rhamnosus GG (CULTURELLE) 10 billion cell capsule, Take 1 capsule by mouth every morning., Disp: , Rfl:     levocetirizine (XYZAL) 5 MG tablet, Take 1 tablet by mouth every morning., Disp: , Rfl:     magnesium 250 mg Tab, Take 1 tablet by mouth every morning., Disp: , Rfl:     meclizine (ANTIVERT) 25 mg tablet, Take 25 mg by mouth 3 (three) times daily as needed., Disp: , Rfl:     montelukast (SINGULAIR) 10 mg tablet, Take 1 tablet by mouth daily as needed., Disp: , Rfl:     multivitamin (THERAGRAN) per tablet, Take 1 tablet by mouth every morning., Disp: , Rfl:     omega-3 fatty acids/fish oil (FISH OIL-OMEGA-3 FATTY ACIDS) 300-1,000 mg capsule, Take 1 capsule by mouth every morning., Disp: , Rfl:     oxybutynin (DITROPAN) 5 MG Tab, Take 10 mg by mouth 2 (two) times daily as needed., Disp: , Rfl:     pantoprazole (PROTONIX) 40 MG tablet, Take 40 mg by mouth 2 (two) times daily., Disp: , Rfl:     phenazopyridine (PYRIDIUM) 200 MG tablet, Take 200 mg by mouth 3 (three) times daily as needed., Disp: , Rfl:     rosuvastatin (CRESTOR) 10 MG tablet, Take 1 tablet by mouth every evening., Disp: , Rfl:     selenium 200 mcg Cap, Take 1 capsule by mouth every morning., Disp: , Rfl:     SYNTHROID 112 mcg tablet, Take 112 mcg by mouth before breakfast., Disp: , Rfl:     tobramycin-dexAMETHasone 0.3-0.1% (TOBRADEX) 0.3-0.1 % DrpS, Place 1 drop into the right eye 3 (three) times daily., Disp: , Rfl:     venlafaxine (EFFEXOR-XR) 75 MG 24 hr  capsule, Take 1 capsule by mouth every morning., Disp: , Rfl:     Review of patient's allergies indicates:   Allergen Reactions    Boniva [ibandronate]     Demerol [meperidine]     Ketamine     Limbitrol [amitriptyline-chlordiazepoxide]     Macrobid [nitrofurantoin monohyd/m-cryst]     Nitrofurantoin     Norco [hydrocodone-acetaminophen]     Sulfa (sulfonamide antibiotics)     Tessalon perle [benzonatate]     Trazodone        Social History     Tobacco Use    Smoking status: Never    Smokeless tobacco: Never   Substance Use Topics    Alcohol use: Not Currently       Family History   Problem Relation Name Age of Onset    Heart disease Mother      Diabetes Mother      Colon cancer Mother      Heart disease Father         Review of Systems  Constitutional- reports no fever, fatigue  Eye- no vision loss  ENMT- no sore throat, ear pain, sinus pain/congestion, nasal congestion/drainage  Respiratory- no cough or shortness of breath  CV- no chest pain, no palpitations  GI- no N/V/D; no abdominal pain    There were no vitals taken for this visit.    Physical exam:  Right Hand Exam     Tenderness   The patient is experiencing tenderness in the snuff box.    Range of Motion   Wrist   Extension:  normal   Flexion:  normal   Hand   MP Thumb: normal   DIP Thumb: normal     Other   Erythema: absent  Scars: present  Sensation: normal  Pulse: present    Comments:  She has exquisite tenderness over the base of the 1st metacarpal and any range of motion of the CMC joint.             Neurological Exam     intact    Imaging:  Xray of the Right hand reveal some arthritic changes seen on the trapezium and some mild subluxation of the CMC joint     Assessment/Plan:  Camila Duncan is a 69 y.o. female who is s/p Rt CMC joint Arthroplasty she was not doing well.  She has further breakdown of the trapezium with subluxation.  She was also developing some arthritis of the joint between the residual trapezium and the scaphoid.  She needs a  complete trapezium resection with a suspension plasty of the 1st to 2nd metacarpal.  She would like to proceed with surgery.    Hand pain, right  -     Place in Outpatient; Standing  -     Case Request Operating Room: INTERPOSITION ARTHROPLASTY, CMC JOINT  -     Vital signs; Standing  -     Insert peripheral IV; Standing  -     Clip and Prep Other (please specifiy) (right hand and wrist); Standing  -     Cleanse with Chlorhexidine (CHG); Standing  -     Diet NPO; Standing  -     Place YEISON hose; Standing  -     Place sequential compression device; Standing  -     Chlorohexidine Gluconate Bath; Standing  -     Inpatient consult to Anesthesiology; Standing    Pain in joint of right hand    Other orders  -     lactated ringers infusion  -     IP VTE LOW RISK PATIENT; Standing  -     ceFAZolin (Ancef) 2 g in D5W 50 mL IVPB

## 2025-02-12 ENCOUNTER — ANESTHESIA EVENT (OUTPATIENT)
Dept: SURGERY | Facility: HOSPITAL | Age: 70
End: 2025-02-12
Payer: MEDICARE

## 2025-02-12 NOTE — ANESTHESIA PREPROCEDURE EVALUATION
02/12/2025  Camila Duncan is a 69 y.o., female.      Pre-op Assessment    I have reviewed the Patient Summary Reports.     I have reviewed the Nursing Notes. I have reviewed the NPO Status.   I have reviewed the Medications.     Review of Systems  Anesthesia Hx:             Denies Family Hx of Anesthesia complications.    Denies Personal Hx of Anesthesia complications.                    Social:  No Alcohol Use, Non-Smoker       Hematology/Oncology:  Hematology Normal   Oncology Normal                                   EENT/Dental:  EENT/Dental Normal           Cardiovascular:  Cardiovascular Normal                  ECG has been reviewed.                            Pulmonary:  Pulmonary Normal                       Hepatic/GI:     GERD, well controlled                Musculoskeletal:  Arthritis               Neurological:  Neurology Normal                                      Endocrine:   Hypothyroidism          Dermatological:  Skin Normal    Psych:  Psychiatric History                  Physical Exam  General: Cooperative, Alert and Oriented    Airway:  Mallampati: II   Mouth Opening: Normal  TM Distance: Normal  Tongue: Normal  Neck ROM: Normal ROM    Dental:  Intact        Anesthesia Plan  Type of Anesthesia, risks & benefits discussed:    Anesthesia Type: Gen Supraglottic Airway  Intra-op Monitoring Plan: Standard ASA Monitors  Post Op Pain Control Plan: multimodal analgesia  Induction:  IV  Informed Consent: Informed consent signed with the Patient and all parties understand the risks and agree with anesthesia plan.  All questions answered. Patient consented to blood products? Yes  ASA Score: 2    Ready For Surgery From Anesthesia Perspective.     .

## 2025-02-13 ENCOUNTER — ANESTHESIA (OUTPATIENT)
Dept: SURGERY | Facility: HOSPITAL | Age: 70
End: 2025-02-13
Payer: MEDICARE

## 2025-02-13 ENCOUNTER — HOSPITAL ENCOUNTER (OUTPATIENT)
Facility: HOSPITAL | Age: 70
Discharge: HOME | End: 2025-02-13
Attending: SPECIALIST | Admitting: SPECIALIST
Payer: MEDICARE

## 2025-02-13 VITALS
OXYGEN SATURATION: 96 % | DIASTOLIC BLOOD PRESSURE: 74 MMHG | SYSTOLIC BLOOD PRESSURE: 125 MMHG | HEIGHT: 67 IN | RESPIRATION RATE: 18 BRPM | BODY MASS INDEX: 26.37 KG/M2 | TEMPERATURE: 98 F | HEART RATE: 87 BPM | WEIGHT: 168 LBS

## 2025-02-13 DIAGNOSIS — M25.541 PAIN IN JOINT OF RIGHT HAND: ICD-10-CM

## 2025-02-13 DIAGNOSIS — M79.641 HAND PAIN, RIGHT: ICD-10-CM

## 2025-02-13 DIAGNOSIS — M18.11 ARTHRITIS OF CARPOMETACARPAL (CMC) JOINT OF RIGHT THUMB: Primary | ICD-10-CM

## 2025-02-13 PROCEDURE — 25000003 PHARM REV CODE 250: Performed by: NURSE ANESTHETIST, CERTIFIED REGISTERED

## 2025-02-13 PROCEDURE — 36000709 HC OR TIME LEV III EA ADD 15 MIN: Performed by: SPECIALIST

## 2025-02-13 PROCEDURE — 37000009 HC ANESTHESIA EA ADD 15 MINS: Performed by: SPECIALIST

## 2025-02-13 PROCEDURE — 63600175 PHARM REV CODE 636 W HCPCS: Performed by: NURSE ANESTHETIST, CERTIFIED REGISTERED

## 2025-02-13 PROCEDURE — 27201423 OPTIME MED/SURG SUP & DEVICES STERILE SUPPLY: Performed by: SPECIALIST

## 2025-02-13 PROCEDURE — C1713 ANCHOR/SCREW BN/BN,TIS/BN: HCPCS | Performed by: SPECIALIST

## 2025-02-13 PROCEDURE — 71000033 HC RECOVERY, INTIAL HOUR: Performed by: SPECIALIST

## 2025-02-13 PROCEDURE — 25000003 PHARM REV CODE 250: Performed by: PHYSICIAN ASSISTANT

## 2025-02-13 PROCEDURE — 63600175 PHARM REV CODE 636 W HCPCS: Performed by: SPECIALIST

## 2025-02-13 PROCEDURE — 71000016 HC POSTOP RECOV ADDL HR: Performed by: SPECIALIST

## 2025-02-13 PROCEDURE — 37000008 HC ANESTHESIA 1ST 15 MINUTES: Performed by: SPECIALIST

## 2025-02-13 PROCEDURE — 71000015 HC POSTOP RECOV 1ST HR: Performed by: SPECIALIST

## 2025-02-13 PROCEDURE — 36000708 HC OR TIME LEV III 1ST 15 MIN: Performed by: SPECIALIST

## 2025-02-13 DEVICE — IMPL SYS,CMC MINI T-ROPE,1.1 MM
Type: IMPLANTABLE DEVICE | Site: HAND | Status: FUNCTIONAL
Brand: ARTHREX®

## 2025-02-13 RX ORDER — OXYCODONE HYDROCHLORIDE 5 MG/1
10 TABLET ORAL EVERY 6 HOURS PRN
Status: DISCONTINUED | OUTPATIENT
Start: 2025-02-13 | End: 2025-02-13 | Stop reason: HOSPADM

## 2025-02-13 RX ORDER — PROPOFOL 10 MG/ML
VIAL (ML) INTRAVENOUS
Status: DISCONTINUED | OUTPATIENT
Start: 2025-02-13 | End: 2025-02-13

## 2025-02-13 RX ORDER — DEXAMETHASONE SODIUM PHOSPHATE 4 MG/ML
INJECTION, SOLUTION INTRA-ARTICULAR; INTRALESIONAL; INTRAMUSCULAR; INTRAVENOUS; SOFT TISSUE
Status: DISCONTINUED | OUTPATIENT
Start: 2025-02-13 | End: 2025-02-13

## 2025-02-13 RX ORDER — ACETAMINOPHEN 10 MG/ML
INJECTION, SOLUTION INTRAVENOUS
Status: DISCONTINUED | OUTPATIENT
Start: 2025-02-13 | End: 2025-02-13

## 2025-02-13 RX ORDER — HYDROMORPHONE HYDROCHLORIDE 2 MG/ML
INJECTION, SOLUTION INTRAMUSCULAR; INTRAVENOUS; SUBCUTANEOUS
Status: DISCONTINUED | OUTPATIENT
Start: 2025-02-13 | End: 2025-02-13

## 2025-02-13 RX ORDER — SODIUM CHLORIDE, SODIUM LACTATE, POTASSIUM CHLORIDE, CALCIUM CHLORIDE 600; 310; 30; 20 MG/100ML; MG/100ML; MG/100ML; MG/100ML
INJECTION, SOLUTION INTRAVENOUS CONTINUOUS
Status: DISCONTINUED | OUTPATIENT
Start: 2025-02-13 | End: 2025-02-13 | Stop reason: HOSPADM

## 2025-02-13 RX ORDER — TRAMADOL HYDROCHLORIDE 50 MG/1
50 TABLET ORAL EVERY 6 HOURS PRN
Status: DISCONTINUED | OUTPATIENT
Start: 2025-02-13 | End: 2025-02-13 | Stop reason: HOSPADM

## 2025-02-13 RX ORDER — MORPHINE SULFATE 4 MG/ML
2 INJECTION, SOLUTION INTRAMUSCULAR; INTRAVENOUS EVERY 4 HOURS PRN
Status: DISCONTINUED | OUTPATIENT
Start: 2025-02-13 | End: 2025-02-13 | Stop reason: HOSPADM

## 2025-02-13 RX ORDER — FENTANYL CITRATE 50 UG/ML
25 INJECTION, SOLUTION INTRAMUSCULAR; INTRAVENOUS EVERY 5 MIN PRN
Status: DISCONTINUED | OUTPATIENT
Start: 2025-02-13 | End: 2025-02-13

## 2025-02-13 RX ORDER — ONDANSETRON 4 MG/1
8 TABLET, ORALLY DISINTEGRATING ORAL EVERY 8 HOURS PRN
Status: DISCONTINUED | OUTPATIENT
Start: 2025-02-13 | End: 2025-02-13 | Stop reason: HOSPADM

## 2025-02-13 RX ORDER — CEFADROXIL 500 MG/1
500 CAPSULE ORAL EVERY 12 HOURS
Qty: 14 CAPSULE | Refills: 0 | Status: SHIPPED | OUTPATIENT
Start: 2025-02-13 | End: 2025-02-20

## 2025-02-13 RX ORDER — ONDANSETRON HYDROCHLORIDE 2 MG/ML
4 INJECTION, SOLUTION INTRAVENOUS EVERY 12 HOURS PRN
Status: DISCONTINUED | OUTPATIENT
Start: 2025-02-13 | End: 2025-02-13 | Stop reason: HOSPADM

## 2025-02-13 RX ORDER — CEFAZOLIN SODIUM 2 G/50ML
2 SOLUTION INTRAVENOUS
Status: COMPLETED | OUTPATIENT
Start: 2025-02-13 | End: 2025-02-13

## 2025-02-13 RX ORDER — GLUCAGON 1 MG
1 KIT INJECTION
Status: DISCONTINUED | OUTPATIENT
Start: 2025-02-13 | End: 2025-02-13

## 2025-02-13 RX ORDER — HYDROMORPHONE HYDROCHLORIDE 2 MG/ML
0.2 INJECTION, SOLUTION INTRAMUSCULAR; INTRAVENOUS; SUBCUTANEOUS EVERY 5 MIN PRN
Status: DISCONTINUED | OUTPATIENT
Start: 2025-02-13 | End: 2025-02-13

## 2025-02-13 RX ORDER — FAMOTIDINE 10 MG/ML
INJECTION INTRAVENOUS
Status: DISCONTINUED | OUTPATIENT
Start: 2025-02-13 | End: 2025-02-13

## 2025-02-13 RX ORDER — ONDANSETRON HYDROCHLORIDE 2 MG/ML
INJECTION, SOLUTION INTRAMUSCULAR; INTRAVENOUS
Status: DISCONTINUED | OUTPATIENT
Start: 2025-02-13 | End: 2025-02-13

## 2025-02-13 RX ORDER — HYDROCODONE BITARTRATE AND ACETAMINOPHEN 10; 325 MG/1; MG/1
1 TABLET ORAL EVERY 8 HOURS PRN
Qty: 21 TABLET | Refills: 0 | Status: SHIPPED | OUTPATIENT
Start: 2025-02-13

## 2025-02-13 RX ORDER — LIDOCAINE HYDROCHLORIDE 20 MG/ML
INJECTION INTRAVENOUS
Status: DISCONTINUED | OUTPATIENT
Start: 2025-02-13 | End: 2025-02-13

## 2025-02-13 RX ORDER — BUPIVACAINE HYDROCHLORIDE 2.5 MG/ML
INJECTION, SOLUTION EPIDURAL; INFILTRATION; INTRACAUDAL
Status: DISCONTINUED | OUTPATIENT
Start: 2025-02-13 | End: 2025-02-13 | Stop reason: HOSPADM

## 2025-02-13 RX ADMIN — ONDANSETRON 4 MG: 2 INJECTION INTRAMUSCULAR; INTRAVENOUS at 09:02

## 2025-02-13 RX ADMIN — DEXAMETHASONE SODIUM PHOSPHATE 8 MG: 4 INJECTION, SOLUTION INTRA-ARTICULAR; INTRALESIONAL; INTRAMUSCULAR; INTRAVENOUS; SOFT TISSUE at 09:02

## 2025-02-13 RX ADMIN — ACETAMINOPHEN 1000 MG: 10 INJECTION, SOLUTION INTRAVENOUS at 11:02

## 2025-02-13 RX ADMIN — HYDROMORPHONE HYDROCHLORIDE 1 MG: 2 INJECTION INTRAMUSCULAR; INTRAVENOUS; SUBCUTANEOUS at 09:02

## 2025-02-13 RX ADMIN — SODIUM CHLORIDE, POTASSIUM CHLORIDE, SODIUM LACTATE AND CALCIUM CHLORIDE: 600; 310; 30; 20 INJECTION, SOLUTION INTRAVENOUS at 11:02

## 2025-02-13 RX ADMIN — PROPOFOL 150 MG: 10 INJECTION, EMULSION INTRAVENOUS at 09:02

## 2025-02-13 RX ADMIN — SODIUM CHLORIDE, POTASSIUM CHLORIDE, SODIUM LACTATE AND CALCIUM CHLORIDE: 600; 310; 30; 20 INJECTION, SOLUTION INTRAVENOUS at 07:02

## 2025-02-13 RX ADMIN — LIDOCAINE HYDROCHLORIDE 100 MG: 20 INJECTION, SOLUTION INTRAVENOUS at 09:02

## 2025-02-13 RX ADMIN — FAMOTIDINE 20 MG: 10 INJECTION INTRAVENOUS at 09:02

## 2025-02-13 RX ADMIN — CEFAZOLIN SODIUM 2 G: 2 SOLUTION INTRAVENOUS at 09:02

## 2025-02-13 RX ADMIN — OXYCODONE 10 MG: 5 TABLET ORAL at 12:02

## 2025-02-13 NOTE — TRANSFER OF CARE
"Anesthesia Transfer of Care Note    Patient: Camila Duncan    Procedure(s) Performed: Procedure(s) (LRB):  ARTHROPLASTY, INTERCARPAL OR CMC JOINT, INTERPOSITION (Right)    Patient location: PACU    Anesthesia Type: general    Transport from OR: Transported from OR on room air with adequate spontaneous ventilation    Post pain: adequate analgesia    Post assessment: no apparent anesthetic complications    Post vital signs: stable    Level of consciousness: responds to stimulation and sedated    Nausea/Vomiting: no nausea/vomiting    Complications: none    Transfer of care protocol was followed      Last vitals: Visit Vitals  /72 (BP Location: Right arm, Patient Position: Sitting)   Pulse 68   Temp 36.6 °C (97.9 °F) (Oral)   Resp 20   Ht 5' 7" (1.702 m)   Wt 76.2 kg (168 lb)   SpO2 98%   Breastfeeding No   BMI 26.31 kg/m²     "

## 2025-02-13 NOTE — DISCHARGE INSTRUCTIONS
DO NOT CHANGE DRESSING UNTIL SEEN BY DR MEDRANO. DO NOT GET WET, KEEP CLEAN & DRY.ELEVATE & APPLY ICE TO HELP WITH PAIN & SWELLING.

## 2025-02-13 NOTE — ANESTHESIA POSTPROCEDURE EVALUATION
Anesthesia Post Evaluation    Patient: Camila Duncan    Procedure(s) Performed: Procedure(s) (LRB):  ARTHROPLASTY, INTERCARPAL OR CMC JOINT, INTERPOSITION (Right)    Final Anesthesia Type: general      Patient location during evaluation: PACU  Patient participation: Yes- Able to Participate  Level of consciousness: awake and alert  Post-procedure vital signs: reviewed and stable  Pain management: adequate  Airway patency: patent  JOSE M mitigation strategies: Multimodal analgesia, Verification of full reversal of neuromuscular block and Extubation and recovery carried out in lateral, semiupright, or other nonsupine position  PONV status at discharge: No PONV  Anesthetic complications: no      Cardiovascular status: hemodynamically stable  Respiratory status: unassisted, spontaneous ventilation and room air  Hydration status: euvolemic  Follow-up not needed.              Vitals Value Taken Time   /61 02/13/25 1153   Temp 36.6 °C (97.9 °F) 02/13/25 1153   Pulse 94 02/13/25 1153   Resp 18 02/13/25 1153   SpO2 98 % 02/13/25 1153         Event Time   Out of Recovery 11:45:33         Pain/Chidi Score: Chidi Score: 9 (2/13/2025 11:40 AM)

## 2025-02-13 NOTE — DISCHARGE SUMMARY
[facility] - Periop Services  Discharge Note  Short Stay    Procedure(s) (LRB):  ARTHROPLASTY, INTERCARPAL OR CMC JOINT, INTERPOSITION (Right)      OUTCOME: Patient tolerated treatment/procedure well without complication and is now ready for discharge.    DISPOSITION: Home or Self Care    FINAL DIAGNOSIS:  Arthritis of carpometacarpal (CMC) joint of right thumb    FOLLOWUP: In clinic    DISCHARGE INSTRUCTIONS:    Discharge Procedure Orders   Diet general     Keep surgical extremity elevated     No driving, operating heavy equipment or signing legal documents while taking pain medication     Call MD for:  temperature >100.4     Call MD for:  persistent nausea and vomiting     Call MD for:  severe uncontrolled pain     Call MD for:  difficulty breathing, headache or visual disturbances     Call MD for:  redness, tenderness, or signs of infection (pain, swelling, redness, odor or green/yellow discharge around incision site)     Call MD for:  hives     Call MD for:  persistent dizziness or light-headedness     Call MD for:  extreme fatigue     Lifting restrictions   Order Comments: No lifting with the affected extremity     Leave dressing on - Keep it clean, dry, and intact until clinic visit        TIME SPENT ON DISCHARGE: 15 minutes

## 2025-02-13 NOTE — ANESTHESIA PROCEDURE NOTES
Intubation    Date/Time: 2/13/2025 9:20 AM    Performed by: Jcarlos Bland CRNA  Authorized by: Kumar Veliz DO    Intubation:     Induction:  Intravenous    Intubated:  Postinduction    Mask Ventilation:  Easy mask    Attempts:  1    Attempted By:  CRNA    Difficult Airway Encountered?: No      Complications:  None    Airway Device:  Supraglottic airway/LMA (IGEL)    Airway Device Size:  3.0    Style/Cuff Inflation:  Uncuffed    Placement Verified By:  Capnometry    Complicating Factors:  None    Findings Post-Intubation:  BS equal bilateral and atraumatic/condition of teeth unchanged

## 2025-02-13 NOTE — OP NOTE
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    Assistant: MISHA Rojas    Anesthesia:  General    Complications: none    Blood loss: nil    Procedure in detail:  Informed consent was obtained.  Risks of the procedure was explained not excluding infection, bleeding, pain, scarring, neurovascular injury, need for future surgery.  This patient had a previous history of CMC arthroplasty with a interposition graft.  She was had subluxation of the base of the 1st metacarpal as well as further breakdown of the trapezium causing severe pain.  She was brought to the OR given IV antibiotics and general anesthesia.  Arm was prepped and draped sterilely.  Incision was made of the previous scar on the dorsum of the CMC.  Dissection was carried down the capsule was opened and the residual graft was removed.  It was found to still be in position.  But she had very osteoporotic crumbling trapezium.  This was excised using a bur and curettes down to the distal scaphoid.  Next the suspension plasty was performed I used a Arthrex tight rope because of her bone quality.  Drove a guide pin bicortical across the base of the 1st and the base of the 2nd metacarpal.  Sutures were passed with the button on the radial side of the base of the 1st and a button on the ulnar side of the 2nd metacarpal.  Gently tensioned the construct and tied a sq knot over the button.  The thumb was stable.  The wound was irrigated the capsule was closed with 2-0 Ethibond.  SubQ was closed 4-0 and 4-0 nylon in the skin.  Sterile dressing was applied no bulky thumb spica splint.  No complications.    Markus Crawford M.D.

## (undated) DEVICE — BLADE SURG #15 CARBON STEEL

## (undated) DEVICE — SUT 4.0 ETHILON

## (undated) DEVICE — TOURNIQUET SB QC DP 18X4IN

## (undated) DEVICE — SYR 10CC LUER LOCK

## (undated) DEVICE — BLANKET THERMOFLECT 4X7

## (undated) DEVICE — PADDING CAST SOFT-ROLL 4 X 4

## (undated) DEVICE — SUT VICRYL 3-0 27 SH

## (undated) DEVICE — Device

## (undated) DEVICE — SUPPORT ULNA NERVE PROTECTOR

## (undated) DEVICE — ADHESIVE DERMABOND ADVANCED

## (undated) DEVICE — DURAPREP SURG SCRUB 26ML

## (undated) DEVICE — GOWN POLY REINF BRTH SLV XL

## (undated) DEVICE — DRESSING TRANS 4X4 TEGADERM

## (undated) DEVICE — BANDAGE ESMARK ELASTIC ST 4X9

## (undated) DEVICE — SUT ETHIBOND XTRA 2-0 SH

## (undated) DEVICE — DRESSING XEROFORM 1X8IN

## (undated) DEVICE — BANDAGE COMPR DBL HOOK 4INX5YD

## (undated) DEVICE — PAD ELECTROSURGICAL SPL W/CORD

## (undated) DEVICE — DRAPE HAND STERILE

## (undated) DEVICE — GLOVE SENSICARE NEOPRENE 6.5

## (undated) DEVICE — SPLINT FIBERGLASS PAD 3X15

## (undated) DEVICE — SOL IRR SOD CHL .9% POUR

## (undated) DEVICE — SUT ETHIBOND 0 CR/MO-7 8-18

## (undated) DEVICE — SUT VICRYL + 0 27IN CT-2

## (undated) DEVICE — SUT 4-0 VICRYL / SH

## (undated) DEVICE — CUFF ATS 2 PORT SNGL BLDR 18IN

## (undated) DEVICE — GLOVE SIGNATURE ESSNTL LTX 8

## (undated) DEVICE — DRAPE MOBILE C-ARM

## (undated) DEVICE — SOCKINETTE IMPERVIOUS 12X48IN

## (undated) DEVICE — GOWN ECLIPSE REINF LVL4 TWL XL

## (undated) DEVICE — GLOVE SENSICARE PI GRN 7.5

## (undated) DEVICE — SUT VICRYL 0 CT-2 27 DYE

## (undated) DEVICE — GLOVE SENSICARE NEOPRENE 7.5

## (undated) DEVICE — POSITIONER HEEL FOAM CONVOLTD

## (undated) DEVICE — GLOVE SENSICARE PI GRN 7

## (undated) DEVICE — GLOVE SENSICARE PI GRN 8

## (undated) DEVICE — SUT 3-0 VICRYL / SH (J416)

## (undated) DEVICE — GLOVE SIGNATURE ESSNTL LTX 7

## (undated) DEVICE — BLANKET WARMING LOWER BODY

## (undated) DEVICE — WRAP COBAN NL STRL 4INX5YD